# Patient Record
Sex: MALE | Race: WHITE | NOT HISPANIC OR LATINO | Employment: PART TIME | URBAN - METROPOLITAN AREA
[De-identification: names, ages, dates, MRNs, and addresses within clinical notes are randomized per-mention and may not be internally consistent; named-entity substitution may affect disease eponyms.]

---

## 2022-03-09 ENCOUNTER — DOCUMENTATION (OUTPATIENT)
Dept: FAMILY MEDICINE CLINIC | Facility: CLINIC | Age: 27
End: 2022-03-09

## 2022-03-10 ENCOUNTER — TELEPHONE (OUTPATIENT)
Dept: FAMILY MEDICINE CLINIC | Facility: CLINIC | Age: 27
End: 2022-03-10

## 2022-03-10 NOTE — TELEPHONE ENCOUNTER
Caller: Filemon Martinez    Relationship: Emergency Contact    Best call back number: [phone]    Who is your current provider: N/A    Who would you like your new provider to be: DR YOON    What are your reasons for transferring care: PATIENT MOVED FROM [place]    Additional notes: MOTHER IS FILEMON MILVIA  [phone]   OKAY TO TAKE AS A NEW PATIENT?

## 2022-03-11 DIAGNOSIS — F19.10 SUBSTANCE ABUSE: Primary | ICD-10-CM

## 2022-03-11 RX ORDER — BUPRENORPHINE 300 MG/1
1.5 SOLUTION SUBCUTANEOUS
Qty: 1.5 ML | Refills: 0 | Status: SHIPPED | OUTPATIENT
Start: 2022-03-11 | End: 2022-04-04 | Stop reason: SDUPTHER

## 2022-04-04 ENCOUNTER — OFFICE VISIT (OUTPATIENT)
Dept: FAMILY MEDICINE CLINIC | Facility: CLINIC | Age: 27
End: 2022-04-04

## 2022-04-04 VITALS
HEIGHT: 69 IN | HEART RATE: 85 BPM | TEMPERATURE: 97.5 F | BODY MASS INDEX: 24.44 KG/M2 | DIASTOLIC BLOOD PRESSURE: 76 MMHG | WEIGHT: 165 LBS | OXYGEN SATURATION: 96 % | SYSTOLIC BLOOD PRESSURE: 118 MMHG | RESPIRATION RATE: 18 BRPM

## 2022-04-04 DIAGNOSIS — B18.2 CHRONIC HEPATITIS C WITHOUT HEPATIC COMA: Primary | ICD-10-CM

## 2022-04-04 DIAGNOSIS — F19.10 SUBSTANCE ABUSE: ICD-10-CM

## 2022-04-04 DIAGNOSIS — F51.4 NIGHT TERRORS: ICD-10-CM

## 2022-04-04 DIAGNOSIS — F31.9 BIPOLAR AFFECTIVE DISORDER, REMISSION STATUS UNSPECIFIED: ICD-10-CM

## 2022-04-04 PROBLEM — B19.20 HEPATITIS C VIRUS INFECTION: Status: ACTIVE | Noted: 2021-04-03

## 2022-04-04 PROCEDURE — 99204 OFFICE O/P NEW MOD 45 MIN: CPT | Performed by: FAMILY MEDICINE

## 2022-04-04 PROCEDURE — 90471 IMMUNIZATION ADMIN: CPT | Performed by: FAMILY MEDICINE

## 2022-04-04 PROCEDURE — 90632 HEPA VACCINE ADULT IM: CPT | Performed by: FAMILY MEDICINE

## 2022-04-04 RX ORDER — ATOMOXETINE 40 MG/1
40 CAPSULE ORAL DAILY
Qty: 30 CAPSULE | Refills: 5 | Status: SHIPPED | OUTPATIENT
Start: 2022-04-04

## 2022-04-04 RX ORDER — PRAZOSIN HYDROCHLORIDE 2 MG/1
2 CAPSULE ORAL NIGHTLY
COMMUNITY
End: 2022-04-04 | Stop reason: SDUPTHER

## 2022-04-04 RX ORDER — PRAZOSIN HYDROCHLORIDE 2 MG/1
2 CAPSULE ORAL NIGHTLY
Qty: 30 CAPSULE | Refills: 5 | Status: SHIPPED | OUTPATIENT
Start: 2022-04-04

## 2022-04-04 RX ORDER — BUPRENORPHINE 300 MG/1
1.5 SOLUTION SUBCUTANEOUS
Qty: 1.5 ML | Refills: 5 | Status: SHIPPED | OUTPATIENT
Start: 2022-04-04 | End: 2022-04-22 | Stop reason: SDUPTHER

## 2022-04-04 RX ORDER — OXCARBAZEPINE 150 MG/1
150 TABLET, FILM COATED ORAL 2 TIMES DAILY
Qty: 60 TABLET | Refills: 5 | Status: SHIPPED | OUTPATIENT
Start: 2022-04-04

## 2022-04-04 RX ORDER — ESCITALOPRAM OXALATE 10 MG/1
10 TABLET ORAL DAILY
COMMUNITY
End: 2022-04-04 | Stop reason: SDUPTHER

## 2022-04-04 RX ORDER — OXCARBAZEPINE 150 MG/1
150 TABLET, FILM COATED ORAL 2 TIMES DAILY
COMMUNITY
End: 2022-04-04 | Stop reason: SDUPTHER

## 2022-04-04 RX ORDER — ESCITALOPRAM OXALATE 10 MG/1
10 TABLET ORAL DAILY
Qty: 30 TABLET | Refills: 5 | Status: SHIPPED | OUTPATIENT
Start: 2022-04-04

## 2022-04-04 NOTE — PROGRESS NOTES
"Chief Complaint   Patient presents with   • Establish Care   • Annual Exam     HPI  Ubaldo Martinez is a 27 y.o. male that presents for   Chief Complaint   Patient presents with   • Establish Care   • Annual Exam     Chronic Hep C: 2/2 IVDU and last use was 2/2022. Has only used twice in the last 10 months. Known infection for the last 6-7 years. Never completed treatment. He understands he needs to be 6 months clean prior to be considered for treatment. Not interested in treatment at this time anyway.    Bipolar: diagnosed in 2019 by psychiatrist in [place]. Unclear if this is truly bipolar vs IVDU withdrawal. Currently maintained on Trileptal 150 BID, atomextine (40?) and Lexapro 10 daily. He feels fine on Lexapro but doesn't like how the Trileptal and atomextine makes him feel. He reports they make him feel \"stuck.\" Kind of like a lack of motivation. Patient has been on this combination for the last year. He reports feeling depressed. Does have some anxiety surrounding court case for possession of fentanyl. Currently seeing counselor in [place] where he got his last Sublocade injection. He is not sure that therapy is helpful as he has been doing this for 10 years.     IVDU: mostly used fentanyl and ICE. Last use was 2/2022. Has been on Sublocade 300mg for the last 8 months. This has been working well and he likes this much better than suboxone. He would like to get this here if possible    Takes prazosin for night terrors. Prevents him from dreaming    Review of Systems   Constitutional: Negative for chills, fever and unexpected weight loss.   HENT: Negative for congestion, rhinorrhea and sore throat.    Eyes: Negative for visual disturbance.   Respiratory: Negative for cough and shortness of breath.    Cardiovascular: Negative for chest pain and palpitations.   Gastrointestinal: Negative for abdominal pain, constipation, diarrhea, nausea and vomiting.   Genitourinary: Negative for difficulty urinating and " dysuria.   Musculoskeletal: Negative for arthralgias and joint swelling.   Skin: Negative for rash and skin lesions.   Neurological: Negative for weakness and headache.   Psychiatric/Behavioral: Positive for depressed mood and stress. The patient is nervous/anxious.      The following portions of the patient's history were reviewed and updated as appropriate: problem list, past medical history, past surgical history, allergies, current medications, past social history and past family history.    Problem List Tab  Patient History Tab  Immunizations Tab  Medications Tab  Chart Review Tab  Care Everywhere Tab  Synopsis Tab    PE  Vitals:    04/04/22 0936   BP: 118/76   Pulse: 85   Resp: 18   Temp: 97.5 °F (36.4 °C)   SpO2: 96%     Body mass index is 24.37 kg/m².  General: Well nourished, NAD  Head: AT/NC  Eyes: EOMI, anicteric sclera  ENT: MMM w/o erythema. TM clear bilaterally  Neck: Supple, no thyromegaly or LAD  Resp: CTAB, SCR, BS equal  CV: RRR w/o m/r/g; 2+ pulses  GI: Soft, NT/ND, +BS  MSK: FROM, no deformity, no edema  Skin: Warm, dry, intact  Neuro: Alert and oriented. No focal deficits  Psych: Appropriate mood and affect    Imaging  No Images in the past 120 days found..    Assessment/Plan   Ubaldo Martinez is a 27 y.o. male that presents for   Chief Complaint   Patient presents with   • Establish Care   • Annual Exam     Diagnoses and all orders for this visit:    1. Chronic hepatitis C without hepatic coma (HCC) (Primary): Counseled that he will need to be 6 months clean before being considered for treatment.  He is not interested in treatment at this time and reports he needs to get stronger and more comfortable in his sobriety first anyway.  Will immunize against hep A as it is not been documented  -     Hepatitis A Vaccine Pediatric / Adolescent 2 Dose IM    2. Substance abuse (HCC): Last used February 2022.  History of fentanyl and ICE abuse  -     Continue buprenorphine ER (Sublocade) 300 MG/1.5ML  solution prefilled syringe; Inject 1.5 mL under the skin into the appropriate area as directed Every 30 (Thirty) Days.  Dispense: 1.5 mL; Refill: 5    3. Bipolar affective disorder, remission status unspecified (HCC): Unclear to me if this is truly bipolar diagnosis versus based on his substance abuse and periods of withdrawal.  Nonetheless, we discussed keeping medications the same until he develops a better routine of being home and not using  -     Continue atomoxetine (Strattera) 40 MG capsule; Take 1 capsule by mouth Daily.  Dispense: 30 capsule; Refill: 5  -     Continue escitalopram (LEXAPRO) 10 MG tablet; Take 1 tablet by mouth Daily.  Dispense: 30 tablet; Refill: 5  -     Continue OXcarbazepine (TRILEPTAL) 150 MG tablet; Take 1 tablet by mouth 2 (Two) Times a Day.  Dispense: 60 tablet; Refill: 5    4. Night terrors  -     Continue prazosin (MINIPRESS) 2 MG capsule; Take 1 capsule by mouth Every Night.  Dispense: 30 capsule; Refill: 5    Labs deferred today per patient request    Preventative:   HepA: Ordered today  Tdap: 2015  Influenza: Recommended  COVID: Completed 2 shots Pfizer (3/25/22)     Return in about 3 months (around 7/4/2022) for Recheck- 30min.

## 2022-04-21 ENCOUNTER — TELEPHONE (OUTPATIENT)
Dept: FAMILY MEDICINE CLINIC | Facility: CLINIC | Age: 27
End: 2022-04-21

## 2022-04-21 DIAGNOSIS — F19.10 SUBSTANCE ABUSE: ICD-10-CM

## 2022-04-21 NOTE — TELEPHONE ENCOUNTER
This was sent to mailorder which I did not see [facility] was the one calling. I called [facility] and they actually DO mail these and it would be ok to mail to our office.  (for future reference)     The claim was already reversed with [employer].     [facility] is stating that they can not mail. Which is across the street.  The note was still on there from [facility]...

## 2022-04-21 NOTE — TELEPHONE ENCOUNTER
Pharmacy Name:  [facility]     Pharmacy representative name: OLIVA    Pharmacy representative phone number:[phone]    What medication are you calling in regards to: Buprenorphine ER (Sublocade) 300 MG/1.5ML solution prefilled syringe    What question does the pharmacy have: CANNOT MAIL TO OFFICE     Who is the provider that prescribed the medication:DR YOON     Additional notes:     INSTRUCTIONS ON PRESCRIPTION SAYS TO MAIL TO OFFICE, PHARMACY IS UNABLE TO MAIL CONTROLLED SUBSTANCE, WILL LIKE TO KNOW IF IT IS OK FOR PATIENT TO  FROM PHARMACY     PLEASE ADVISE

## 2022-04-22 NOTE — TELEPHONE ENCOUNTER
Dr. Sellers, I tried to send this today but I do not have the security to do so. It is pending to send back to [facility]

## 2022-04-25 RX ORDER — BUPRENORPHINE 300 MG/1
1.5 SOLUTION SUBCUTANEOUS
Qty: 1.5 ML | Refills: 5 | Status: SHIPPED | OUTPATIENT
Start: 2022-04-25 | End: 2022-04-29 | Stop reason: SDUPTHER

## 2022-04-29 DIAGNOSIS — F19.10 SUBSTANCE ABUSE: ICD-10-CM

## 2022-04-29 RX ORDER — BUPRENORPHINE 300 MG/1
1.5 SOLUTION SUBCUTANEOUS
Qty: 1.5 ML | Refills: 5 | Status: SHIPPED | OUTPATIENT
Start: 2022-04-29

## 2022-05-02 ENCOUNTER — TELEPHONE (OUTPATIENT)
Dept: FAMILY MEDICINE CLINIC | Facility: CLINIC | Age: 27
End: 2022-05-02

## 2022-05-02 NOTE — TELEPHONE ENCOUNTER
Pharmacy Name: [facility] - [place], [place] - [address] - [phone] [facility] [phone] FX  [id number] - [place] - [address] [facility] [address] - [phone] PH - [phone] FX  [facility] - [place], [place] - [address]  - [phone] [facility] [phone] FX     What medication are you calling in regards to:Buprenorphine ER (Sublocade) 300 MG/1.5ML solution prefilled syringe    What question does the pharmacy have:  ENROLLMENT FORM AND MEDICATION LIST NEEDED BEFORE IT CAN BE FILLED

## 2022-05-04 NOTE — TELEPHONE ENCOUNTER
Caller: [facility] - [place], [place] - [address] - [phone]  - [phone] FX    Relationship to patient: Pharmacy    Best call back number: [phone]    Patient is needing: EMELIA FROM [facility] CALLED AND SAID SHE FAXED OVER THE ENROLLMENT FORMS ON 05/02 AND HAVE NOT RECEIVED ANYTHING BACK YET    CALLING TO CHECK ON STATUS

## 2022-05-13 NOTE — TELEPHONE ENCOUNTER
Caller: [facility] - [place], [place] - [address] - [phone]  - [phone] FX    Relationship to patient: Pharmacy    Best call back number: [phone]    Patient is needing: [facility] CALLED AND SAID THEY NEED AN EXPECTED INJECTION DATE FOR WHEN THE PATIENT CAN RECEIVE THIS MEDICATION BEFORE THEY SEND IT OUT

## 2022-05-16 NOTE — TELEPHONE ENCOUNTER
Spoke with Darin. He put it in the chart and said if they had any other questions they would give us a call.

## 2024-09-15 ENCOUNTER — HOSPITAL ENCOUNTER (EMERGENCY)
Facility: HOSPITAL | Age: 29
Discharge: HOME | End: 2024-09-15
Admitting: EMERGENCY MEDICINE
Payer: COMMERCIAL

## 2024-09-15 VITALS
OXYGEN SATURATION: 99 % | RESPIRATION RATE: 18 BRPM | TEMPERATURE: 97.7 F | BODY MASS INDEX: 25.47 KG/M2 | SYSTOLIC BLOOD PRESSURE: 117 MMHG | HEIGHT: 69 IN | WEIGHT: 171.96 LBS | DIASTOLIC BLOOD PRESSURE: 54 MMHG | HEART RATE: 97 BPM

## 2024-09-15 DIAGNOSIS — L03.119 CELLULITIS OF UPPER EXTREMITY, UNSPECIFIED LATERALITY: Primary | ICD-10-CM

## 2024-09-15 DIAGNOSIS — R11.2 NAUSEA AND VOMITING, UNSPECIFIED VOMITING TYPE: ICD-10-CM

## 2024-09-15 LAB
ALBUMIN SERPL-MCNC: 4.1 G/DL (ref 3.5–5.2)
ALBUMIN/GLOB SERPL: 1.5 G/DL
ALP SERPL-CCNC: 74 U/L (ref 39–117)
ALT SERPL W P-5'-P-CCNC: 16 U/L (ref 1–41)
ANION GAP SERPL CALCULATED.3IONS-SCNC: 9.2 MMOL/L (ref 5–15)
AST SERPL-CCNC: 31 U/L (ref 1–40)
BASOPHILS # BLD AUTO: 0.02 10*3/MM3 (ref 0–0.2)
BASOPHILS NFR BLD AUTO: 0.2 % (ref 0–1.5)
BILIRUB SERPL-MCNC: 0.6 MG/DL (ref 0–1.2)
BUN SERPL-MCNC: 8 MG/DL (ref 6–20)
BUN/CREAT SERPL: 8.3 (ref 7–25)
CALCIUM SPEC-SCNC: 8.9 MG/DL (ref 8.6–10.5)
CHLORIDE SERPL-SCNC: 98 MMOL/L (ref 98–107)
CO2 SERPL-SCNC: 28.8 MMOL/L (ref 22–29)
CREAT SERPL-MCNC: 0.96 MG/DL (ref 0.76–1.27)
DEPRECATED RDW RBC AUTO: 40.6 FL (ref 37–54)
EGFRCR SERPLBLD CKD-EPI 2021: 111.1 ML/MIN/1.73
EOSINOPHIL # BLD AUTO: 0.07 10*3/MM3 (ref 0–0.4)
EOSINOPHIL NFR BLD AUTO: 0.8 % (ref 0.3–6.2)
ERYTHROCYTE [DISTWIDTH] IN BLOOD BY AUTOMATED COUNT: 12.8 % (ref 12.3–15.4)
GLOBULIN UR ELPH-MCNC: 2.7 GM/DL
GLUCOSE SERPL-MCNC: 110 MG/DL (ref 65–99)
HCT VFR BLD AUTO: 35 % (ref 37.5–51)
HGB BLD-MCNC: 11.8 G/DL (ref 13–17.7)
IMM GRANULOCYTES # BLD AUTO: 0.03 10*3/MM3 (ref 0–0.05)
IMM GRANULOCYTES NFR BLD AUTO: 0.3 % (ref 0–0.5)
LYMPHOCYTES # BLD AUTO: 2.53 10*3/MM3 (ref 0.7–3.1)
LYMPHOCYTES NFR BLD AUTO: 28 % (ref 19.6–45.3)
MCH RBC QN AUTO: 29.4 PG (ref 26.6–33)
MCHC RBC AUTO-ENTMCNC: 33.7 G/DL (ref 31.5–35.7)
MCV RBC AUTO: 87.1 FL (ref 79–97)
MONOCYTES # BLD AUTO: 1.12 10*3/MM3 (ref 0.1–0.9)
MONOCYTES NFR BLD AUTO: 12.4 % (ref 5–12)
MRSA DNA SPEC QL NAA+PROBE: NORMAL
NEUTROPHILS NFR BLD AUTO: 5.26 10*3/MM3 (ref 1.7–7)
NEUTROPHILS NFR BLD AUTO: 58.3 % (ref 42.7–76)
NRBC BLD AUTO-RTO: 0 /100 WBC (ref 0–0.2)
PLATELET # BLD AUTO: 292 10*3/MM3 (ref 140–450)
PMV BLD AUTO: 10.2 FL (ref 6–12)
POTASSIUM SERPL-SCNC: 3.7 MMOL/L (ref 3.5–5.2)
PROT SERPL-MCNC: 6.8 G/DL (ref 6–8.5)
RBC # BLD AUTO: 4.02 10*6/MM3 (ref 4.14–5.8)
SODIUM SERPL-SCNC: 136 MMOL/L (ref 136–145)
WBC NRBC COR # BLD AUTO: 9.03 10*3/MM3 (ref 3.4–10.8)

## 2024-09-15 PROCEDURE — 25810000003 SODIUM CHLORIDE 0.9 % SOLUTION: Performed by: NURSE PRACTITIONER

## 2024-09-15 PROCEDURE — 96374 THER/PROPH/DIAG INJ IV PUSH: CPT

## 2024-09-15 PROCEDURE — 99283 EMERGENCY DEPT VISIT LOW MDM: CPT

## 2024-09-15 PROCEDURE — 80053 COMPREHEN METABOLIC PANEL: CPT | Performed by: NURSE PRACTITIONER

## 2024-09-15 PROCEDURE — 87641 MR-STAPH DNA AMP PROBE: CPT | Performed by: NURSE PRACTITIONER

## 2024-09-15 PROCEDURE — 25010000002 ONDANSETRON PER 1 MG: Performed by: NURSE PRACTITIONER

## 2024-09-15 PROCEDURE — C1751 CATH, INF, PER/CENT/MIDLINE: HCPCS

## 2024-09-15 PROCEDURE — 85025 COMPLETE CBC W/AUTO DIFF WBC: CPT | Performed by: NURSE PRACTITIONER

## 2024-09-15 PROCEDURE — 36415 COLL VENOUS BLD VENIPUNCTURE: CPT

## 2024-09-15 RX ORDER — CEPHALEXIN 500 MG/1
500 CAPSULE ORAL 4 TIMES DAILY
Qty: 40 CAPSULE | Refills: 0 | Status: SHIPPED | OUTPATIENT
Start: 2024-09-15

## 2024-09-15 RX ORDER — ONDANSETRON 2 MG/ML
4 INJECTION INTRAMUSCULAR; INTRAVENOUS ONCE
Status: COMPLETED | OUTPATIENT
Start: 2024-09-15 | End: 2024-09-15

## 2024-09-15 RX ORDER — SODIUM CHLORIDE 0.9 % (FLUSH) 0.9 %
10 SYRINGE (ML) INJECTION AS NEEDED
Status: DISCONTINUED | OUTPATIENT
Start: 2024-09-15 | End: 2024-09-15 | Stop reason: HOSPADM

## 2024-09-15 RX ADMIN — SODIUM CHLORIDE 500 ML: 9 INJECTION, SOLUTION INTRAVENOUS at 09:54

## 2024-09-15 RX ADMIN — ONDANSETRON 4 MG: 2 INJECTION INTRAMUSCULAR; INTRAVENOUS at 09:54

## 2024-09-18 ENCOUNTER — DOCUMENTATION (OUTPATIENT)
Dept: FAMILY MEDICINE CLINIC | Facility: CLINIC | Age: 29
End: 2024-09-18
Payer: COMMERCIAL

## 2024-09-18 RX ORDER — CLINDAMYCIN HCL 300 MG
300 CAPSULE ORAL 3 TIMES DAILY
Qty: 21 CAPSULE | Refills: 0 | Status: SHIPPED | OUTPATIENT
Start: 2024-09-18 | End: 2024-09-25

## 2024-10-06 DIAGNOSIS — F19.10 IV DRUG ABUSE: Primary | ICD-10-CM

## 2024-10-06 RX ORDER — BUPRENORPHINE HYDROCHLORIDE AND NALOXONE HYDROCHLORIDE DIHYDRATE 2; .5 MG/1; MG/1
3 TABLET SUBLINGUAL DAILY
Qty: 90 TABLET | Refills: 0 | Status: SHIPPED | OUTPATIENT
Start: 2024-10-06

## 2024-10-09 ENCOUNTER — LAB (OUTPATIENT)
Dept: LAB | Facility: HOSPITAL | Age: 29
End: 2024-10-09
Payer: COMMERCIAL

## 2024-10-09 DIAGNOSIS — F19.10 IV DRUG ABUSE: ICD-10-CM

## 2024-10-09 LAB
AMPHET+METHAMPHET UR QL: NEGATIVE
AMPHETAMINES UR QL: NEGATIVE
BARBITURATES UR QL SCN: NEGATIVE
BENZODIAZ UR QL SCN: NEGATIVE
BUPRENORPHINE SERPL-MCNC: POSITIVE NG/ML
CANNABINOIDS SERPL QL: NEGATIVE
COCAINE UR QL: NEGATIVE
METHADONE UR QL SCN: NEGATIVE
OPIATES UR QL: NEGATIVE
OXYCODONE UR QL SCN: NEGATIVE
PCP UR QL SCN: NEGATIVE
TRICYCLICS UR QL SCN: NEGATIVE

## 2024-10-09 PROCEDURE — 80306 DRUG TEST PRSMV INSTRMNT: CPT

## 2024-10-10 ENCOUNTER — TELEPHONE (OUTPATIENT)
Dept: FAMILY MEDICINE CLINIC | Facility: CLINIC | Age: 29
End: 2024-10-10
Payer: COMMERCIAL

## 2024-10-10 NOTE — TELEPHONE ENCOUNTER
Please Relay:  Natalia Martinez a detailed message with results, per verbal release.     Urine looks great- keep it up!

## 2024-11-13 RX ORDER — AZITHROMYCIN 250 MG/1
TABLET, FILM COATED ORAL
Qty: 6 TABLET | Refills: 0 | Status: SHIPPED | OUTPATIENT
Start: 2024-11-13

## 2024-11-25 ENCOUNTER — LAB (OUTPATIENT)
Dept: LAB | Facility: HOSPITAL | Age: 29
End: 2024-11-25
Payer: COMMERCIAL

## 2024-11-25 DIAGNOSIS — F19.11 DRUG ABUSE IN REMISSION: ICD-10-CM

## 2024-11-25 DIAGNOSIS — F19.11 DRUG ABUSE IN REMISSION: Primary | ICD-10-CM

## 2024-11-25 DIAGNOSIS — F19.10 IV DRUG ABUSE: ICD-10-CM

## 2024-11-25 PROCEDURE — 80306 DRUG TEST PRSMV INSTRMNT: CPT

## 2024-11-25 RX ORDER — BUPRENORPHINE HYDROCHLORIDE AND NALOXONE HYDROCHLORIDE DIHYDRATE 2; .5 MG/1; MG/1
3 TABLET SUBLINGUAL DAILY
Qty: 90 TABLET | Refills: 0 | Status: SHIPPED | OUTPATIENT
Start: 2024-11-25

## 2024-12-05 ENCOUNTER — OFFICE VISIT (OUTPATIENT)
Dept: FAMILY MEDICINE CLINIC | Facility: CLINIC | Age: 29
End: 2024-12-05
Payer: COMMERCIAL

## 2024-12-05 VITALS
HEIGHT: 69 IN | OXYGEN SATURATION: 97 % | BODY MASS INDEX: 27.08 KG/M2 | SYSTOLIC BLOOD PRESSURE: 122 MMHG | DIASTOLIC BLOOD PRESSURE: 72 MMHG | WEIGHT: 182.8 LBS | HEART RATE: 67 BPM

## 2024-12-05 DIAGNOSIS — F19.90 IVDU (INTRAVENOUS DRUG USER): ICD-10-CM

## 2024-12-05 DIAGNOSIS — Z00.00 ANNUAL PHYSICAL EXAM: Primary | ICD-10-CM

## 2024-12-05 DIAGNOSIS — Z86.19 HISTORY OF HEPATITIS C: ICD-10-CM

## 2024-12-05 PROCEDURE — 99395 PREV VISIT EST AGE 18-39: CPT | Performed by: FAMILY MEDICINE

## 2024-12-05 NOTE — PROGRESS NOTES
Chief Complaint   Patient presents with    Annual Exam     HPI  Ubaldo Martinez is a 29 y.o. male that presents for   Chief Complaint   Patient presents with    Annual Exam     IVDU: maintained on Suboxone 6-1.5 daily. This has been very helpful. Last use was Sept 2024. Mostly used fentanyl and ICE. Talks about he is tired of the cycle and doesn't think about using anymore. Tired of looking for drugs/money, being in half-way, not having place to sleep, 's license suspended, etc... Feels it is best to not have his license to have more time     Chronic Hep C: 2/2 IVDU. Completed treatment 2 years ago and had confirmatory labs June 2024 w/ negative PCR     Bipolar: diagnosed in 2019 by psychiatrist in [place]. Unclear if this is truly bipolar vs IVDU withdrawal. No longer maintained on any meds.     Denies fever, chills, nightsweats    Review of Systems  Pertinent positives of ROS documented in HPI    The following portions of the patient's history were reviewed and updated as appropriate: problem list, past medical history, past surgical history, allergies, current medications, past social history and past family history.    Problem List Tab  Patient History Tab  Immunizations Tab  Medications Tab  Chart Review Tab  Care Everywhere Tab  Synopsis Tab    PE  Vitals:    12/05/24 1432   BP: 122/72   Pulse: 67   SpO2: 97%     Body mass index is 26.99 kg/m².  General: Well nourished, NAD  Head: AT/NC  Eyes: EOMI, anicteric sclera  ENT: MMM w/o erythema. TM clear bilaterally  Neck: Supple, no thyromegaly or LAD  Resp: CTAB, SCR, BS equal  CV: RRR w/ 2/6 ALVARADO; 2+ pulses  GI: Soft, NT/ND, +BS  MSK: FROM, no deformity, no edema  Skin: Warm, dry, intact  Neuro: Alert and oriented. No focal deficits  Psych: Appropriate mood and affect    Imaging  No Images in the past 120 days found..    Assessment & Plan   Ubaldo Martinez is a 29 y.o. male that presents for   Chief Complaint   Patient presents with    Annual Exam     Diagnoses  and all orders for this visit:    1. Annual physical exam (Primary)   - Recent labs reviewed. Defer annual labs for now   - Counseled regarding exercise and preventative health maintenance items/immunizations below    2. IVDU (intravenous drug user): clean since 9/2024  -     Urine Drug Screen - Urine, Clean Catch; Future  - Cont home Suboxone 6-1.5 daily  - Low threshold to obtain echo given murmur on exam   -- Pt reports lifelong murmur    3. History of hepatitis C: s/p curative treatment   - Resolved    Preventative:   HepA: Completed 1 of 2 in 2022  Tdap: 2015  Influenza: Recommended  COVID: Completed 2 shots Pfizer (3/25/22)       Return in about 6 months (around 6/5/2025) for Recheck- 30min.

## 2024-12-19 RX ORDER — PREDNISONE 20 MG/1
TABLET ORAL
Qty: 15 TABLET | Refills: 0 | Status: SHIPPED | OUTPATIENT
Start: 2024-12-19

## 2024-12-19 RX ORDER — CYCLOBENZAPRINE HCL 10 MG
10 TABLET ORAL 3 TIMES DAILY PRN
Qty: 30 TABLET | Refills: 1 | Status: SHIPPED | OUTPATIENT
Start: 2024-12-19

## 2025-01-09 DIAGNOSIS — F19.10 IV DRUG ABUSE: ICD-10-CM

## 2025-01-09 RX ORDER — BUPRENORPHINE HYDROCHLORIDE AND NALOXONE HYDROCHLORIDE DIHYDRATE 2; .5 MG/1; MG/1
3 TABLET SUBLINGUAL DAILY
Qty: 90 TABLET | Refills: 0 | Status: SHIPPED | OUTPATIENT
Start: 2025-01-09